# Patient Record
Sex: MALE | Race: WHITE | Employment: OTHER | URBAN - METROPOLITAN AREA
[De-identification: names, ages, dates, MRNs, and addresses within clinical notes are randomized per-mention and may not be internally consistent; named-entity substitution may affect disease eponyms.]

---

## 2018-01-15 ENCOUNTER — HOSPITAL ENCOUNTER (OUTPATIENT)
Dept: GENERAL RADIOLOGY | Age: 63
Discharge: HOME | End: 2018-01-15
Attending: FAMILY MEDICINE
Payer: COMMERCIAL

## 2018-01-15 DIAGNOSIS — G89.29 CHRONIC RIGHT SHOULDER PAIN: ICD-10-CM

## 2018-01-15 DIAGNOSIS — M25.511 CHRONIC RIGHT SHOULDER PAIN: ICD-10-CM

## 2018-01-15 DIAGNOSIS — G89.29 CHRONIC BILATERAL LOW BACK PAIN WITHOUT SCIATICA: ICD-10-CM

## 2018-01-15 DIAGNOSIS — M54.50 CHRONIC BILATERAL LOW BACK PAIN WITHOUT SCIATICA: ICD-10-CM

## 2018-01-15 PROCEDURE — 73030 X-RAY EXAM OF SHOULDER: CPT

## 2018-01-15 PROCEDURE — 72100 X-RAY EXAM L-S SPINE 2/3 VWS: CPT

## 2018-01-15 NOTE — PROGRESS NOTES
The only thing seen on XR of lumbar was mild DDD (osteoarthritis of the spine) and the XRay of his shoulder showed OA at the [facility], [place] [facility]. So, really just arthritis in both places. ANti-inflammatories help, but it may be better for him to use an anti-inflammatory gel, so it will not be systemic and hurt his kidneys. I can send it in for him if he would like. Would he like that sent to his pharmacy?

## 2018-01-15 NOTE — PROGRESS NOTES
Patient was notified and understood, he said yes he would like for the gel to be sent into his pharmacy

## 2018-02-08 NOTE — PROGRESS NOTES
Mild arthritis noted on xray - if pain persists we can refer to ortho for further evaluation and treatment.   Please call the office and ask for Marek Adam while Dr Herb Brice is out on leave for referral if wanted

## 2019-02-28 ENCOUNTER — HOSPITAL ENCOUNTER (OUTPATIENT)
Dept: CT IMAGING | Age: 64
Discharge: HOME | End: 2019-02-28
Attending: FAMILY MEDICINE
Payer: COMMERCIAL

## 2019-02-28 DIAGNOSIS — R25.1 TREMOR: ICD-10-CM

## 2019-02-28 DIAGNOSIS — M62.81 MUSCLE WEAKNESS OF LEFT UPPER EXTREMITY: ICD-10-CM

## 2019-02-28 PROCEDURE — 70450 CT HEAD/BRAIN W/O DYE: CPT

## 2019-05-25 ENCOUNTER — HOSPITAL ENCOUNTER (OUTPATIENT)
Dept: MRI IMAGING | Age: 64
Discharge: HOME | End: 2019-05-25
Attending: PSYCHIATRY & NEUROLOGY
Payer: COMMERCIAL

## 2019-05-25 DIAGNOSIS — G95.9 CERVICAL MYELOPATHY (HCC): ICD-10-CM

## 2019-05-25 PROCEDURE — 72156 MRI NECK SPINE W/O & W/DYE: CPT

## 2019-05-25 PROCEDURE — 74011250636 HC RX REV CODE- 250/636: Performed by: PSYCHIATRY & NEUROLOGY

## 2019-05-25 PROCEDURE — A9575 INJ GADOTERATE MEGLUMI 0.1ML: HCPCS | Performed by: PSYCHIATRY & NEUROLOGY

## 2019-05-25 RX ORDER — SODIUM CHLORIDE 0.9 % (FLUSH) 0.9 %
10 SYRINGE (ML) INJECTION
Status: COMPLETED | OUTPATIENT
Start: 2019-05-25 | End: 2019-05-25

## 2019-05-25 RX ORDER — GADOTERATE MEGLUMINE 376.9 MG/ML
17 INJECTION INTRAVENOUS
Status: COMPLETED | OUTPATIENT
Start: 2019-05-25 | End: 2019-05-25

## 2019-05-25 RX ADMIN — GADOTERATE MEGLUMINE 17 ML: 376.9 INJECTION INTRAVENOUS at 08:26

## 2019-05-25 RX ADMIN — Medication 10 ML: at 08:26
